# Patient Record
Sex: FEMALE | URBAN - METROPOLITAN AREA
[De-identification: names, ages, dates, MRNs, and addresses within clinical notes are randomized per-mention and may not be internally consistent; named-entity substitution may affect disease eponyms.]

---

## 2023-10-13 ENCOUNTER — APPOINTMENT (RX ONLY)
Dept: URBAN - METROPOLITAN AREA CLINIC 48 | Facility: CLINIC | Age: 28
Setting detail: DERMATOLOGY
End: 2023-10-13

## 2023-10-13 DIAGNOSIS — Z41.9 ENCOUNTER FOR PROCEDURE FOR PURPOSES OTHER THAN REMEDYING HEALTH STATE, UNSPECIFIED: ICD-10-CM

## 2023-10-13 PROCEDURE — ? FILLERS

## 2023-10-13 NOTE — PROCEDURE: FILLERS
Dorsal Hands Filler Volume In Cc: 0
Expiration Date (Month Year): 10/20/2024
Post-Care Instructions: Patient instructed to apply ice to reduce swelling.
Expiration Date (Month Year): 10/29/2023
Additional Area 1 Location: lips
Aspiration Statement: Aspiration was performed prior to injecting site with filler.
Include Cannula Information In Note?: No
Additional Area 1 Volume In Cc: 0.6
Include Cannula Information In Note?: Yes
Include Cannula Size?: 25G
Filler: Juvederm Ultra
Additional Area 1 Location: chin
Detail Level: Detailed
Lot #: [id number]
Map Statment: See Attach Map for Complete Details
Expiration Date (Month Year): 03/16/2023
Lot #: [id number]
Consent: Written consent obtained. Risks include but not limited to bruising, beading, irregular texture, ulceration, infection, allergic reaction, scar formation, incomplete augmentation, temporary nature, procedural pain.
Lot #: [id number]

## 2025-08-06 ENCOUNTER — APPOINTMENT (OUTPATIENT)
Dept: URBAN - METROPOLITAN AREA CLINIC 7 | Facility: CLINIC | Age: 30
Setting detail: DERMATOLOGY
End: 2025-08-06

## 2025-08-06 PROCEDURE — ? WEEKLY EVALUATION AND MANAGEMENT FOR RADIATION
